# Patient Record
Sex: MALE | Race: WHITE | NOT HISPANIC OR LATINO | Employment: UNEMPLOYED | URBAN - METROPOLITAN AREA
[De-identification: names, ages, dates, MRNs, and addresses within clinical notes are randomized per-mention and may not be internally consistent; named-entity substitution may affect disease eponyms.]

---

## 2017-09-13 ENCOUNTER — OFFICE VISIT (OUTPATIENT)
Dept: PRIMARY CARE CLINIC | Facility: CLINIC | Age: 22
End: 2017-09-13
Payer: COMMERCIAL

## 2017-09-13 VITALS
HEART RATE: 75 BPM | BODY MASS INDEX: 19.95 KG/M2 | WEIGHT: 139.38 LBS | RESPIRATION RATE: 18 BRPM | HEIGHT: 70 IN | SYSTOLIC BLOOD PRESSURE: 117 MMHG | DIASTOLIC BLOOD PRESSURE: 73 MMHG | TEMPERATURE: 98 F | OXYGEN SATURATION: 97 %

## 2017-09-13 DIAGNOSIS — F90.2 ATTENTION DEFICIT HYPERACTIVITY DISORDER, COMBINED TYPE: Primary | ICD-10-CM

## 2017-09-13 PROCEDURE — 99213 OFFICE O/P EST LOW 20 MIN: CPT | Mod: S$GLB,,, | Performed by: FAMILY MEDICINE

## 2017-09-13 PROCEDURE — 3008F BODY MASS INDEX DOCD: CPT | Mod: S$GLB,,, | Performed by: FAMILY MEDICINE

## 2017-09-13 RX ORDER — DEXTROAMPHETAMINE SACCHARATE, AMPHETAMINE ASPARTATE, DEXTROAMPHETAMINE SULFATE AND AMPHETAMINE SULFATE 7.5; 7.5; 7.5; 7.5 MG/1; MG/1; MG/1; MG/1
0.5 TABLET ORAL DAILY
Qty: 15 TABLET | Refills: 0 | Status: SHIPPED | OUTPATIENT
Start: 2017-11-12 | End: 2017-12-27 | Stop reason: SDUPTHER

## 2017-09-13 RX ORDER — BUPROPION HYDROCHLORIDE 150 MG/1
150 TABLET ORAL DAILY
COMMUNITY
End: 2017-09-13 | Stop reason: SDUPTHER

## 2017-09-13 RX ORDER — DEXTROAMPHETAMINE SACCHARATE, AMPHETAMINE ASPARTATE MONOHYDRATE, DEXTROAMPHETAMINE SULFATE AND AMPHETAMINE SULFATE 7.5; 7.5; 7.5; 7.5 MG/1; MG/1; MG/1; MG/1
30 CAPSULE, EXTENDED RELEASE ORAL EVERY MORNING
Qty: 30 CAPSULE | Refills: 0 | Status: SHIPPED | OUTPATIENT
Start: 2017-10-13 | End: 2017-12-27 | Stop reason: SDUPTHER

## 2017-09-13 RX ORDER — DEXTROAMPHETAMINE SACCHARATE, AMPHETAMINE ASPARTATE, DEXTROAMPHETAMINE SULFATE AND AMPHETAMINE SULFATE 7.5; 7.5; 7.5; 7.5 MG/1; MG/1; MG/1; MG/1
0.5 TABLET ORAL DAILY
Qty: 15 TABLET | Refills: 0 | Status: SHIPPED | OUTPATIENT
Start: 2017-10-13 | End: 2017-12-27 | Stop reason: SDUPTHER

## 2017-09-13 RX ORDER — DEXTROAMPHETAMINE SACCHARATE, AMPHETAMINE ASPARTATE, DEXTROAMPHETAMINE SULFATE AND AMPHETAMINE SULFATE 7.5; 7.5; 7.5; 7.5 MG/1; MG/1; MG/1; MG/1
0.5 TABLET ORAL DAILY
COMMUNITY
End: 2017-09-13 | Stop reason: SDUPTHER

## 2017-09-13 RX ORDER — BUPROPION HYDROCHLORIDE 150 MG/1
150 TABLET ORAL DAILY
Qty: 30 TABLET | Refills: 5 | Status: SHIPPED | OUTPATIENT
Start: 2017-09-13 | End: 2017-12-27 | Stop reason: SDUPTHER

## 2017-09-13 RX ORDER — DEXTROAMPHETAMINE SACCHARATE, AMPHETAMINE ASPARTATE MONOHYDRATE, DEXTROAMPHETAMINE SULFATE AND AMPHETAMINE SULFATE 7.5; 7.5; 7.5; 7.5 MG/1; MG/1; MG/1; MG/1
30 CAPSULE, EXTENDED RELEASE ORAL EVERY MORNING
Qty: 30 CAPSULE | Refills: 0 | Status: SHIPPED | OUTPATIENT
Start: 2017-09-13 | End: 2017-12-27 | Stop reason: SDUPTHER

## 2017-09-13 RX ORDER — DEXTROAMPHETAMINE SACCHARATE, AMPHETAMINE ASPARTATE MONOHYDRATE, DEXTROAMPHETAMINE SULFATE AND AMPHETAMINE SULFATE 7.5; 7.5; 7.5; 7.5 MG/1; MG/1; MG/1; MG/1
30 CAPSULE, EXTENDED RELEASE ORAL EVERY MORNING
COMMUNITY
End: 2017-09-13 | Stop reason: SDUPTHER

## 2017-09-13 RX ORDER — DEXTROAMPHETAMINE SACCHARATE, AMPHETAMINE ASPARTATE, DEXTROAMPHETAMINE SULFATE AND AMPHETAMINE SULFATE 7.5; 7.5; 7.5; 7.5 MG/1; MG/1; MG/1; MG/1
0.5 TABLET ORAL DAILY
Qty: 15 TABLET | Refills: 0 | Status: SHIPPED | OUTPATIENT
Start: 2017-09-13 | End: 2017-12-27 | Stop reason: SDUPTHER

## 2017-09-13 RX ORDER — DEXTROAMPHETAMINE SACCHARATE, AMPHETAMINE ASPARTATE MONOHYDRATE, DEXTROAMPHETAMINE SULFATE AND AMPHETAMINE SULFATE 7.5; 7.5; 7.5; 7.5 MG/1; MG/1; MG/1; MG/1
30 CAPSULE, EXTENDED RELEASE ORAL EVERY MORNING
Qty: 30 CAPSULE | Refills: 0 | Status: SHIPPED | OUTPATIENT
Start: 2017-11-12 | End: 2017-12-27 | Stop reason: SDUPTHER

## 2017-09-13 NOTE — PROGRESS NOTES
Subjective:       Patient ID: Anil Velasquez is a 21 y.o. male.    Chief Complaint: Medication Refill    Patient presents for ADHD medication refill.  Prescribed medication has been working well with regard to efficacy (improved focus and attention, less easily distracted).  Tolerating medication well without significant adverse side effects (loss of appetite, insomnia, irritability, chest pain/palpitations). Not currently in school, but working full-time at [employer]. Contemplating future plans WRT work, education, etc.      Review of Systems   Constitutional: Negative for appetite change and unexpected weight change.   Cardiovascular: Negative for chest pain and palpitations.   Psychiatric/Behavioral: Negative for agitation and sleep disturbance. The patient is not nervous/anxious.        Objective:      Physical Exam   Constitutional: He is oriented to person, place, and time. He appears well-developed and well-nourished.   HENT:   Head: Normocephalic and atraumatic.   Cardiovascular: Normal rate, regular rhythm and normal heart sounds.    Pulmonary/Chest: Effort normal and breath sounds normal.   Musculoskeletal: He exhibits no edema.   Neurological: He is alert and oriented to person, place, and time.   Skin: Skin is warm and dry.   Psychiatric: He has a normal mood and affect. His behavior is normal.   Vitals reviewed.      Assessment:       1. Attention deficit hyperactivity disorder, combined type        Plan:       Attention deficit hyperactivity disorder, combined type  -     buPROPion (WELLBUTRIN XL) 150 MG TB24 tablet; Take 1 tablet (150 mg total) by mouth once daily.  Dispense: 30 tablet; Refill: 5  -     dextroamphetamine-amphetamine (ADDERALL XR) 30 MG 24 hr capsule; Take 1 capsule (30 mg total) by mouth every morning.  Dispense: 30 capsule; Refill: 0  -     dextroamphetamine-amphetamine 30 mg Tab; Take 0.5 tablets by mouth once daily. In the afternoon  Dispense: 15 tablet; Refill: 0  -      dextroamphetamine-amphetamine (ADDERALL XR) 30 MG 24 hr capsule; Take 1 capsule (30 mg total) by mouth every morning.  Dispense: 30 capsule; Refill: 0  -     dextroamphetamine-amphetamine (ADDERALL XR) 30 MG 24 hr capsule; Take 1 capsule (30 mg total) by mouth every morning.  Dispense: 30 capsule; Refill: 0  -     dextroamphetamine-amphetamine 30 mg Tab; Take 0.5 tablets by mouth once daily. In the afternoon  Dispense: 15 tablet; Refill: 0  -     dextroamphetamine-amphetamine 30 mg Tab; Take 0.5 tablets by mouth once daily. In the afternoon  Dispense: 15 tablet; Refill: 0

## 2017-12-27 ENCOUNTER — OFFICE VISIT (OUTPATIENT)
Dept: PRIMARY CARE CLINIC | Facility: CLINIC | Age: 22
End: 2017-12-27
Payer: COMMERCIAL

## 2017-12-27 VITALS
WEIGHT: 146.5 LBS | HEIGHT: 70 IN | HEART RATE: 72 BPM | BODY MASS INDEX: 20.97 KG/M2 | DIASTOLIC BLOOD PRESSURE: 70 MMHG | OXYGEN SATURATION: 98 % | SYSTOLIC BLOOD PRESSURE: 117 MMHG | TEMPERATURE: 98 F | RESPIRATION RATE: 18 BRPM

## 2017-12-27 DIAGNOSIS — Z20.828 EXPOSURE TO INFLUENZA: ICD-10-CM

## 2017-12-27 DIAGNOSIS — F90.2 ATTENTION DEFICIT HYPERACTIVITY DISORDER, COMBINED TYPE: Primary | ICD-10-CM

## 2017-12-27 PROCEDURE — 99213 OFFICE O/P EST LOW 20 MIN: CPT | Mod: S$GLB,,, | Performed by: FAMILY MEDICINE

## 2017-12-27 PROCEDURE — 99999 PR PBB SHADOW E&M-EST. PATIENT-LVL III: CPT | Mod: PBBFAC,,, | Performed by: FAMILY MEDICINE

## 2017-12-27 RX ORDER — DEXTROAMPHETAMINE SACCHARATE, AMPHETAMINE ASPARTATE MONOHYDRATE, DEXTROAMPHETAMINE SULFATE AND AMPHETAMINE SULFATE 7.5; 7.5; 7.5; 7.5 MG/1; MG/1; MG/1; MG/1
30 CAPSULE, EXTENDED RELEASE ORAL EVERY MORNING
Qty: 30 CAPSULE | Refills: 0 | Status: SHIPPED | OUTPATIENT
Start: 2018-01-26 | End: 2018-03-27

## 2017-12-27 RX ORDER — DEXTROAMPHETAMINE SACCHARATE, AMPHETAMINE ASPARTATE, DEXTROAMPHETAMINE SULFATE AND AMPHETAMINE SULFATE 7.5; 7.5; 7.5; 7.5 MG/1; MG/1; MG/1; MG/1
0.5 TABLET ORAL DAILY
Qty: 15 TABLET | Refills: 0 | Status: SHIPPED | OUTPATIENT
Start: 2018-01-26 | End: 2018-03-27

## 2017-12-27 RX ORDER — BUPROPION HYDROCHLORIDE 150 MG/1
150 TABLET ORAL DAILY
Qty: 30 TABLET | Refills: 5 | Status: SHIPPED | OUTPATIENT
Start: 2017-12-27 | End: 2021-03-16

## 2017-12-27 RX ORDER — DEXTROAMPHETAMINE SACCHARATE, AMPHETAMINE ASPARTATE, DEXTROAMPHETAMINE SULFATE AND AMPHETAMINE SULFATE 7.5; 7.5; 7.5; 7.5 MG/1; MG/1; MG/1; MG/1
0.5 TABLET ORAL DAILY
Qty: 15 TABLET | Refills: 0 | Status: SHIPPED | OUTPATIENT
Start: 2018-02-25 | End: 2018-03-27 | Stop reason: SDUPTHER

## 2017-12-27 RX ORDER — DEXTROAMPHETAMINE SACCHARATE, AMPHETAMINE ASPARTATE, DEXTROAMPHETAMINE SULFATE AND AMPHETAMINE SULFATE 7.5; 7.5; 7.5; 7.5 MG/1; MG/1; MG/1; MG/1
0.5 TABLET ORAL DAILY
Qty: 15 TABLET | Refills: 0 | Status: SHIPPED | OUTPATIENT
Start: 2017-12-27 | End: 2018-03-27

## 2017-12-27 RX ORDER — DEXTROAMPHETAMINE SACCHARATE, AMPHETAMINE ASPARTATE MONOHYDRATE, DEXTROAMPHETAMINE SULFATE AND AMPHETAMINE SULFATE 7.5; 7.5; 7.5; 7.5 MG/1; MG/1; MG/1; MG/1
30 CAPSULE, EXTENDED RELEASE ORAL EVERY MORNING
Qty: 30 CAPSULE | Refills: 0 | Status: SHIPPED | OUTPATIENT
Start: 2018-02-25 | End: 2018-03-27 | Stop reason: SDUPTHER

## 2017-12-27 RX ORDER — OSELTAMIVIR PHOSPHATE 75 MG/1
75 CAPSULE ORAL DAILY
Qty: 10 CAPSULE | Refills: 0 | Status: SHIPPED | OUTPATIENT
Start: 2017-12-27 | End: 2018-01-06

## 2017-12-27 RX ORDER — DEXTROAMPHETAMINE SACCHARATE, AMPHETAMINE ASPARTATE MONOHYDRATE, DEXTROAMPHETAMINE SULFATE AND AMPHETAMINE SULFATE 7.5; 7.5; 7.5; 7.5 MG/1; MG/1; MG/1; MG/1
30 CAPSULE, EXTENDED RELEASE ORAL EVERY MORNING
Qty: 30 CAPSULE | Refills: 0 | Status: SHIPPED | OUTPATIENT
Start: 2017-12-27 | End: 2018-03-27

## 2017-12-27 NOTE — PROGRESS NOTES
"Subjective:       Patient ID: Anil Velasquez is a 22 y.o. male.    Chief Complaint: Medication Refill    Patient presents for ADHD medication refill.  Prescribed medication has been working well with regard to efficacy (improved focus and attention, less easily distracted).  Tolerating medication well without significant adverse side effects (loss of appetite, insomnia, irritability, chest pain/palpitations).  Patient's mother diagnosed with influenza yesterday, patient not currently symptomatic but wants something to prevent the flu      Review of Systems   Constitutional: Negative for appetite change, chills, fever and unexpected weight change.   Respiratory: Negative for shortness of breath.    Cardiovascular: Negative for chest pain and palpitations.   Psychiatric/Behavioral: Negative for sleep disturbance.       Objective:      Vitals:    12/27/17 1649   BP: 117/70   BP Location: Left arm   Patient Position: Sitting   BP Method: Medium (Automatic)   Pulse: 72   Resp: 18   Temp: 97.6 °F (36.4 °C)   TempSrc: Oral   SpO2: 98%   Weight: 66.5 kg (146 lb 8 oz)   Height: 5' 10" (1.778 m)     Physical Exam   Constitutional: He is oriented to person, place, and time. He appears well-developed and well-nourished.   HENT:   Head: Normocephalic and atraumatic.   Cardiovascular: Normal rate, regular rhythm and normal heart sounds.    Pulmonary/Chest: Effort normal and breath sounds normal.   Musculoskeletal: He exhibits no edema.   Neurological: He is alert and oriented to person, place, and time.   Skin: Skin is warm and dry.   Psychiatric: He has a normal mood and affect. His behavior is normal.   Nursing note and vitals reviewed.      Assessment:       1. Attention deficit hyperactivity disorder, combined type    2. Exposure to influenza        Plan:       Attention deficit hyperactivity disorder, combined type  -     dextroamphetamine-amphetamine (ADDERALL XR) 30 MG 24 hr capsule; Take 1 capsule (30 mg total) by mouth " every morning.  Dispense: 30 capsule; Refill: 0  -     dextroamphetamine-amphetamine 30 mg Tab; Take 0.5 tablets by mouth once daily. In the afternoon  Dispense: 15 tablet; Refill: 0  -     buPROPion (WELLBUTRIN XL) 150 MG TB24 tablet; Take 1 tablet (150 mg total) by mouth once daily.  Dispense: 30 tablet; Refill: 5  -     dextroamphetamine-amphetamine (ADDERALL XR) 30 MG 24 hr capsule; Take 1 capsule (30 mg total) by mouth every morning.  Dispense: 30 capsule; Refill: 0  -     dextroamphetamine-amphetamine (ADDERALL XR) 30 MG 24 hr capsule; Take 1 capsule (30 mg total) by mouth every morning.  Dispense: 30 capsule; Refill: 0  -     dextroamphetamine-amphetamine 30 mg Tab; Take 0.5 tablets by mouth once daily. In the afternoon  Dispense: 15 tablet; Refill: 0  -     dextroamphetamine-amphetamine 30 mg Tab; Take 0.5 tablets by mouth once daily. In the afternoon  Dispense: 15 tablet; Refill: 0    Exposure to influenza  -     oseltamivir (TAMIFLU) 75 MG capsule; Take 1 capsule (75 mg total) by mouth once daily.  Dispense: 10 capsule; Refill: 0      Medication List with Changes/Refills   New Medications    OSELTAMIVIR (TAMIFLU) 75 MG CAPSULE    Take 1 capsule (75 mg total) by mouth once daily.   Changed and/or Refilled Medications    Modified Medication Previous Medication    BUPROPION (WELLBUTRIN XL) 150 MG TB24 TABLET buPROPion (WELLBUTRIN XL) 150 MG TB24 tablet       Take 1 tablet (150 mg total) by mouth once daily.    Take 1 tablet (150 mg total) by mouth once daily.    DEXTROAMPHETAMINE-AMPHETAMINE (ADDERALL XR) 30 MG 24 HR CAPSULE dextroamphetamine-amphetamine (ADDERALL XR) 30 MG 24 hr capsule       Take 1 capsule (30 mg total) by mouth every morning.    Take 1 capsule (30 mg total) by mouth every morning.    DEXTROAMPHETAMINE-AMPHETAMINE (ADDERALL XR) 30 MG 24 HR CAPSULE dextroamphetamine-amphetamine (ADDERALL XR) 30 MG 24 hr capsule       Take 1 capsule (30 mg total) by mouth every morning.    Take 1 capsule (30  mg total) by mouth every morning.    DEXTROAMPHETAMINE-AMPHETAMINE (ADDERALL XR) 30 MG 24 HR CAPSULE dextroamphetamine-amphetamine (ADDERALL XR) 30 MG 24 hr capsule       Take 1 capsule (30 mg total) by mouth every morning.    Take 1 capsule (30 mg total) by mouth every morning.    DEXTROAMPHETAMINE-AMPHETAMINE 30 MG TAB dextroamphetamine-amphetamine 30 mg Tab       Take 0.5 tablets by mouth once daily. In the afternoon    Take 0.5 tablets by mouth once daily. In the afternoon    DEXTROAMPHETAMINE-AMPHETAMINE 30 MG TAB dextroamphetamine-amphetamine 30 mg Tab       Take 0.5 tablets by mouth once daily. In the afternoon    Take 0.5 tablets by mouth once daily. In the afternoon    DEXTROAMPHETAMINE-AMPHETAMINE 30 MG TAB dextroamphetamine-amphetamine 30 mg Tab       Take 0.5 tablets by mouth once daily. In the afternoon    Take 0.5 tablets by mouth once daily. In the afternoon

## 2017-12-27 NOTE — LETTER
December 27, 2017      [facility]  [address] [place] [id number]  Phone: [phone]  Fax: [phone]       Patient: Anil Velasquez   YOB: 1995  Date of Visit: 12/27/2017    To Whom It May Concern:    Kristin Velasquez  was at [facility] on 12/27/2017. He may return to work/school on 12/28/17 with no restrictions. If you have any questions or concerns, or if I can be of further assistance, please do not hesitate to contact me.    Sincerely,    Juan Cardenas MA

## 2018-03-27 ENCOUNTER — OFFICE VISIT (OUTPATIENT)
Dept: PRIMARY CARE CLINIC | Facility: CLINIC | Age: 23
End: 2018-03-27
Payer: COMMERCIAL

## 2018-03-27 VITALS
RESPIRATION RATE: 18 BRPM | HEIGHT: 70 IN | SYSTOLIC BLOOD PRESSURE: 120 MMHG | OXYGEN SATURATION: 97 % | HEART RATE: 65 BPM | BODY MASS INDEX: 21.62 KG/M2 | WEIGHT: 151 LBS | TEMPERATURE: 97 F | DIASTOLIC BLOOD PRESSURE: 74 MMHG

## 2018-03-27 DIAGNOSIS — F90.2 ATTENTION DEFICIT HYPERACTIVITY DISORDER, COMBINED TYPE: ICD-10-CM

## 2018-03-27 PROCEDURE — 99213 OFFICE O/P EST LOW 20 MIN: CPT | Mod: S$GLB,,, | Performed by: FAMILY MEDICINE

## 2018-03-27 PROCEDURE — 99999 PR PBB SHADOW E&M-EST. PATIENT-LVL III: CPT | Mod: PBBFAC,,, | Performed by: FAMILY MEDICINE

## 2018-03-27 RX ORDER — DEXTROAMPHETAMINE SACCHARATE, AMPHETAMINE ASPARTATE MONOHYDRATE, DEXTROAMPHETAMINE SULFATE AND AMPHETAMINE SULFATE 7.5; 7.5; 7.5; 7.5 MG/1; MG/1; MG/1; MG/1
30 CAPSULE, EXTENDED RELEASE ORAL EVERY MORNING
Qty: 30 CAPSULE | Refills: 0 | Status: SHIPPED | OUTPATIENT
Start: 2018-03-27 | End: 2018-06-27

## 2018-03-27 RX ORDER — DEXTROAMPHETAMINE SACCHARATE, AMPHETAMINE ASPARTATE, DEXTROAMPHETAMINE SULFATE AND AMPHETAMINE SULFATE 7.5; 7.5; 7.5; 7.5 MG/1; MG/1; MG/1; MG/1
0.5 TABLET ORAL DAILY
Qty: 15 TABLET | Refills: 0 | Status: SHIPPED | OUTPATIENT
Start: 2018-04-26 | End: 2018-06-27

## 2018-03-27 RX ORDER — DEXTROAMPHETAMINE SACCHARATE, AMPHETAMINE ASPARTATE MONOHYDRATE, DEXTROAMPHETAMINE SULFATE AND AMPHETAMINE SULFATE 7.5; 7.5; 7.5; 7.5 MG/1; MG/1; MG/1; MG/1
30 CAPSULE, EXTENDED RELEASE ORAL EVERY MORNING
Qty: 30 CAPSULE | Refills: 0 | Status: SHIPPED | OUTPATIENT
Start: 2018-04-26 | End: 2018-06-27

## 2018-03-27 RX ORDER — BUPROPION HYDROCHLORIDE 150 MG/1
150 TABLET ORAL DAILY
Qty: 30 TABLET | Refills: 5 | Status: CANCELLED | OUTPATIENT
Start: 2018-03-27

## 2018-03-27 RX ORDER — DEXTROAMPHETAMINE SACCHARATE, AMPHETAMINE ASPARTATE, DEXTROAMPHETAMINE SULFATE AND AMPHETAMINE SULFATE 7.5; 7.5; 7.5; 7.5 MG/1; MG/1; MG/1; MG/1
0.5 TABLET ORAL DAILY
Qty: 15 TABLET | Refills: 0 | Status: SHIPPED | OUTPATIENT
Start: 2018-05-26 | End: 2018-06-27 | Stop reason: SDUPTHER

## 2018-03-27 RX ORDER — DEXTROAMPHETAMINE SACCHARATE, AMPHETAMINE ASPARTATE, DEXTROAMPHETAMINE SULFATE AND AMPHETAMINE SULFATE 7.5; 7.5; 7.5; 7.5 MG/1; MG/1; MG/1; MG/1
0.5 TABLET ORAL DAILY
Qty: 15 TABLET | Refills: 0 | Status: SHIPPED | OUTPATIENT
Start: 2018-03-27 | End: 2018-06-27

## 2018-03-27 RX ORDER — DEXTROAMPHETAMINE SACCHARATE, AMPHETAMINE ASPARTATE MONOHYDRATE, DEXTROAMPHETAMINE SULFATE AND AMPHETAMINE SULFATE 7.5; 7.5; 7.5; 7.5 MG/1; MG/1; MG/1; MG/1
30 CAPSULE, EXTENDED RELEASE ORAL EVERY MORNING
Qty: 30 CAPSULE | Refills: 0 | Status: SHIPPED | OUTPATIENT
Start: 2018-05-26 | End: 2018-06-27 | Stop reason: SDUPTHER

## 2018-03-27 NOTE — LETTER
March 27, 2018      [facility]  [address] [place] [id number]  Phone: [phone]  Fax: [phone]       Patient: Anil Velasquez   YOB: 1995  Date of Visit: 03/27/2018    To Whom It May Concern:    Kristin Velasquez  was at [facility] on 03/27/2018. He may return to work on 3/28/2018 with no restrictions. If you have any questions or concerns, or if I can be of further assistance, please do not hesitate to contact me.    Sincerely,    Zainab Frausto LPN

## 2018-03-27 NOTE — PROGRESS NOTES
"Subjective:       Patient ID: Anil Velasquez is a 22 y.o. male.    Chief Complaint: Medication Refill    Patient presents for ADHD medication refill.  Prescribed medication has been working well with regard to efficacy (improved focus and attention, less easily distracted).  Tolerating medication well without significant adverse side effects (loss of appetite, insomnia, irritability, chest pain/palpitations). 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      Review of Systems   Constitutional: Negative for appetite change and unexpected weight change.   Respiratory: Negative for shortness of breath.    Cardiovascular: Negative for chest pain and palpitations.   Psychiatric/Behavioral: Negative for agitation, confusion and sleep disturbance.       Objective:      Vitals:    03/27/18 1600   BP: 120/74   BP Location: Right arm   Patient Position: Sitting   BP Method: Medium (Automatic)   Pulse: 65   Resp: 18   Temp: 97.4 °F (36.3 °C)   TempSrc: Oral   SpO2: 97%   Weight: 68.5 kg (151 lb)   Height: 5' 10" (1.778 m)     Physical Exam    Assessment:       1. Attention deficit hyperactivity disorder, combined type        Plan:       Attention deficit hyperactivity disorder, combined type  -     dextroamphetamine-amphetamine (ADDERALL XR) 30 MG 24 hr capsule; Take 1 capsule (30 mg total) by mouth every morning.  Dispense: 30 capsule; Refill: 0  -     dextroamphetamine-amphetamine 30 mg Tab; Take 0.5 tablets by mouth once daily. In the afternoon  Dispense: 15 tablet; Refill: 0  -     dextroamphetamine-amphetamine (ADDERALL XR) 30 MG 24 hr capsule; Take 1 capsule (30 mg total) by mouth every morning.  Dispense: 30 capsule; Refill: 0  -     dextroamphetamine-amphetamine (ADDERALL XR) 30 MG 24 hr capsule; Take 1 capsule (30 mg total) by mouth every morning.  Dispense: 30 capsule; Refill: 0  -     dextroamphetamine-amphetamine 30 mg Tab; Take 0.5 tablets by mouth once daily. In the afternoon  Dispense: 15 tablet; Refill: " 0  -     dextroamphetamine-amphetamine 30 mg Tab; Take 0.5 tablets by mouth once daily. In the afternoon  Dispense: 15 tablet; Refill: 0    Other orders  -     Cancel: buPROPion (WELLBUTRIN XL) 150 MG TB24 tablet; Take 1 tablet (150 mg total) by mouth once daily.  Dispense: 30 tablet; Refill: 5      Medication List with Changes/Refills   New Medications    DEXTROAMPHETAMINE-AMPHETAMINE (ADDERALL XR) 30 MG 24 HR CAPSULE    Take 1 capsule (30 mg total) by mouth every morning.    DEXTROAMPHETAMINE-AMPHETAMINE (ADDERALL XR) 30 MG 24 HR CAPSULE    Take 1 capsule (30 mg total) by mouth every morning.   Current Medications    BUPROPION (WELLBUTRIN XL) 150 MG TB24 TABLET    Take 1 tablet (150 mg total) by mouth once daily.   Changed and/or Refilled Medications    Modified Medication Previous Medication    DEXTROAMPHETAMINE-AMPHETAMINE (ADDERALL XR) 30 MG 24 HR CAPSULE dextroamphetamine-amphetamine (ADDERALL XR) 30 MG 24 hr capsule       Take 1 capsule (30 mg total) by mouth every morning.    Take 1 capsule (30 mg total) by mouth every morning.    DEXTROAMPHETAMINE-AMPHETAMINE 30 MG TAB dextroamphetamine-amphetamine 30 mg Tab       Take 0.5 tablets by mouth once daily. In the afternoon    Take 0.5 tablets by mouth once daily. In the afternoon    DEXTROAMPHETAMINE-AMPHETAMINE 30 MG TAB dextroamphetamine-amphetamine 30 mg Tab       Take 0.5 tablets by mouth once daily. In the afternoon    Take 0.5 tablets by mouth once daily. In the afternoon    DEXTROAMPHETAMINE-AMPHETAMINE 30 MG TAB dextroamphetamine-amphetamine 30 mg Tab       Take 0.5 tablets by mouth once daily. In the afternoon    Take 0.5 tablets by mouth once daily. In the afternoon   Discontinued Medications    DEXTROAMPHETAMINE-AMPHETAMINE (ADDERALL XR) 30 MG 24 HR CAPSULE    Take 1 capsule (30 mg total) by mouth every morning.    DEXTROAMPHETAMINE-AMPHETAMINE (ADDERALL XR) 30 MG 24 HR CAPSULE    Take 1 capsule (30 mg total) by mouth every morning.

## 2018-06-27 ENCOUNTER — OFFICE VISIT (OUTPATIENT)
Dept: PRIMARY CARE CLINIC | Facility: CLINIC | Age: 23
End: 2018-06-27
Payer: COMMERCIAL

## 2018-06-27 ENCOUNTER — CLINICAL SUPPORT (OUTPATIENT)
Dept: PRIMARY CARE CLINIC | Facility: CLINIC | Age: 23
End: 2018-06-27
Payer: COMMERCIAL

## 2018-06-27 VITALS
HEART RATE: 75 BPM | BODY MASS INDEX: 23.05 KG/M2 | DIASTOLIC BLOOD PRESSURE: 69 MMHG | SYSTOLIC BLOOD PRESSURE: 110 MMHG | TEMPERATURE: 98 F | OXYGEN SATURATION: 98 % | RESPIRATION RATE: 18 BRPM | HEIGHT: 70 IN | WEIGHT: 161 LBS

## 2018-06-27 DIAGNOSIS — Z79.899 HIGH RISK MEDICATION USE: ICD-10-CM

## 2018-06-27 DIAGNOSIS — F90.2 ATTENTION DEFICIT HYPERACTIVITY DISORDER, COMBINED TYPE: Primary | ICD-10-CM

## 2018-06-27 DIAGNOSIS — B35.6 TINEA CRURIS: ICD-10-CM

## 2018-06-27 PROCEDURE — 99214 OFFICE O/P EST MOD 30 MIN: CPT | Mod: S$GLB,,, | Performed by: FAMILY MEDICINE

## 2018-06-27 PROCEDURE — 99999 PR PBB SHADOW E&M-EST. PATIENT-LVL III: CPT | Mod: PBBFAC,,, | Performed by: FAMILY MEDICINE

## 2018-06-27 PROCEDURE — 3008F BODY MASS INDEX DOCD: CPT | Mod: CPTII,S$GLB,, | Performed by: FAMILY MEDICINE

## 2018-06-27 PROCEDURE — 80307 DRUG TEST PRSMV CHEM ANLYZR: CPT

## 2018-06-27 RX ORDER — DEXTROAMPHETAMINE SACCHARATE, AMPHETAMINE ASPARTATE MONOHYDRATE, DEXTROAMPHETAMINE SULFATE AND AMPHETAMINE SULFATE 7.5; 7.5; 7.5; 7.5 MG/1; MG/1; MG/1; MG/1
30 CAPSULE, EXTENDED RELEASE ORAL EVERY MORNING
Qty: 30 CAPSULE | Refills: 0 | Status: SHIPPED | OUTPATIENT
Start: 2018-08-26 | End: 2021-03-16

## 2018-06-27 RX ORDER — DEXTROAMPHETAMINE SACCHARATE, AMPHETAMINE ASPARTATE, DEXTROAMPHETAMINE SULFATE AND AMPHETAMINE SULFATE 7.5; 7.5; 7.5; 7.5 MG/1; MG/1; MG/1; MG/1
0.5 TABLET ORAL DAILY
Qty: 15 TABLET | Refills: 0 | Status: SHIPPED | OUTPATIENT
Start: 2018-08-26 | End: 2021-03-16

## 2018-06-27 RX ORDER — DEXTROAMPHETAMINE SACCHARATE, AMPHETAMINE ASPARTATE MONOHYDRATE, DEXTROAMPHETAMINE SULFATE AND AMPHETAMINE SULFATE 7.5; 7.5; 7.5; 7.5 MG/1; MG/1; MG/1; MG/1
30 CAPSULE, EXTENDED RELEASE ORAL EVERY MORNING
Qty: 30 CAPSULE | Refills: 0 | Status: SHIPPED | OUTPATIENT
Start: 2018-06-27 | End: 2021-03-16

## 2018-06-27 RX ORDER — CLOTRIMAZOLE AND BETAMETHASONE DIPROPIONATE 10; .64 MG/G; MG/G
CREAM TOPICAL 2 TIMES DAILY
Qty: 45 G | Refills: 1 | Status: SHIPPED | OUTPATIENT
Start: 2018-06-27 | End: 2021-03-16

## 2018-06-27 RX ORDER — DEXTROAMPHETAMINE SACCHARATE, AMPHETAMINE ASPARTATE, DEXTROAMPHETAMINE SULFATE AND AMPHETAMINE SULFATE 7.5; 7.5; 7.5; 7.5 MG/1; MG/1; MG/1; MG/1
0.5 TABLET ORAL DAILY
Qty: 15 TABLET | Refills: 0 | Status: SHIPPED | OUTPATIENT
Start: 2018-06-27 | End: 2021-03-16

## 2018-06-27 RX ORDER — DEXTROAMPHETAMINE SACCHARATE, AMPHETAMINE ASPARTATE, DEXTROAMPHETAMINE SULFATE AND AMPHETAMINE SULFATE 7.5; 7.5; 7.5; 7.5 MG/1; MG/1; MG/1; MG/1
0.5 TABLET ORAL DAILY
Qty: 15 TABLET | Refills: 0 | Status: SHIPPED | OUTPATIENT
Start: 2018-07-27 | End: 2021-03-16

## 2018-06-27 RX ORDER — DEXTROAMPHETAMINE SACCHARATE, AMPHETAMINE ASPARTATE MONOHYDRATE, DEXTROAMPHETAMINE SULFATE AND AMPHETAMINE SULFATE 7.5; 7.5; 7.5; 7.5 MG/1; MG/1; MG/1; MG/1
30 CAPSULE, EXTENDED RELEASE ORAL EVERY MORNING
Qty: 30 CAPSULE | Refills: 0 | Status: SHIPPED | OUTPATIENT
Start: 2018-07-27 | End: 2021-03-16

## 2018-06-27 NOTE — PROGRESS NOTES
"Subjective:       Patient ID: Anil Velasquez is a 22 y.o. male.    Chief Complaint: ADHD    Patient presents for ADHD medication refill.  Prescribed medication has been working well with regard to efficacy (improved focus and attention, less easily distracted).  Tolerating medication well without significant adverse side effects (loss of appetite, insomnia, irritability, chest pain/palpitations).    Rash   This is a new problem. The current episode started in the past 7 days. The problem has been gradually worsening since onset. The affected locations include the groin. The rash is characterized by redness and itchiness. He was exposed to nothing. Pertinent negatives include no congestion, fatigue, fever, shortness of breath, sore throat or vomiting. Past treatments include nothing. There is no history of allergies, asthma or eczema.     Review of Systems   Constitutional: Negative for appetite change, fatigue, fever and unexpected weight change.   HENT: Negative for congestion and sore throat.    Respiratory: Negative for shortness of breath.    Cardiovascular: Negative for chest pain and palpitations.   Gastrointestinal: Negative for nausea and vomiting.   Genitourinary: Negative for difficulty urinating.   Skin: Positive for rash.   Neurological: Negative for dizziness.   Hematological: Does not bruise/bleed easily.   Psychiatric/Behavioral: Negative for agitation, confusion and sleep disturbance.       Objective:      Vitals:    06/27/18 1543   BP: 110/69   BP Location: Left arm   Patient Position: Sitting   BP Method: Medium (Automatic)   Pulse: 75   Resp: 18   Temp: 98.2 °F (36.8 °C)   TempSrc: Oral   SpO2: 98%   Weight: 73 kg (161 lb)   Height: 5' 10" (1.778 m)     Physical Exam   Constitutional: He is oriented to person, place, and time. He appears well-developed and well-nourished.   HENT:   Head: Normocephalic and atraumatic.   Eyes: EOM are normal.   Neck: Neck supple. No JVD present.   Cardiovascular: " Normal rate, regular rhythm and normal heart sounds.    Pulmonary/Chest: Effort normal and breath sounds normal.   Musculoskeletal: He exhibits no edema.   Neurological: He is alert and oriented to person, place, and time.   Skin: Skin is warm and dry. Rash (erythematous, excoriated patchy rash in groin bilaterally) noted.   Psychiatric: He has a normal mood and affect. His behavior is normal.   Nursing note and vitals reviewed.      Assessment:       1. Attention deficit hyperactivity disorder, combined type    2. High risk medication use    3. Tinea cruris        Plan:       Attention deficit hyperactivity disorder, combined type  -     dextroamphetamine-amphetamine 30 mg Tab; Take 0.5 tablets by mouth once daily. In the afternoon  Dispense: 15 tablet; Refill: 0  -     dextroamphetamine-amphetamine (ADDERALL XR) 30 MG 24 hr capsule; Take 1 capsule (30 mg total) by mouth every morning.  Dispense: 30 capsule; Refill: 0  -     dextroamphetamine-amphetamine 30 mg Tab; Take 0.5 tablets by mouth once daily. In the afternoon  Dispense: 15 tablet; Refill: 0  -     dextroamphetamine-amphetamine 30 mg Tab; Take 0.5 tablets by mouth once daily. In the afternoon  Dispense: 15 tablet; Refill: 0  -     dextroamphetamine-amphetamine (ADDERALL XR) 30 MG 24 hr capsule; Take 1 capsule (30 mg total) by mouth every morning.  Dispense: 30 capsule; Refill: 0  -     dextroamphetamine-amphetamine (ADDERALL XR) 30 MG 24 hr capsule; Take 1 capsule (30 mg total) by mouth every morning.  Dispense: 30 capsule; Refill: 0    High risk medication use  Comments:  CSA signed  Orders:  -     [facility] Drug Screen    Tinea cruris  -     clotrimazole-betamethasone 1-0.05% (LOTRISONE) cream; Apply topically 2 (two) times daily.  Dispense: 45 g; Refill: 1      Medication List with Changes/Refills   New Medications    CLOTRIMAZOLE-BETAMETHASONE 1-0.05% (LOTRISONE) CREAM    Apply topically 2 (two) times daily.   Current Medications    BUPROPION  (WELLBUTRIN XL) 150 MG TB24 TABLET    Take 1 tablet (150 mg total) by mouth once daily.   Changed and/or Refilled Medications    Modified Medication Previous Medication    DEXTROAMPHETAMINE-AMPHETAMINE (ADDERALL XR) 30 MG 24 HR CAPSULE dextroamphetamine-amphetamine (ADDERALL XR) 30 MG 24 hr capsule       Take 1 capsule (30 mg total) by mouth every morning.    Take 1 capsule (30 mg total) by mouth every morning.    DEXTROAMPHETAMINE-AMPHETAMINE (ADDERALL XR) 30 MG 24 HR CAPSULE dextroamphetamine-amphetamine (ADDERALL XR) 30 MG 24 hr capsule       Take 1 capsule (30 mg total) by mouth every morning.    Take 1 capsule (30 mg total) by mouth every morning.    DEXTROAMPHETAMINE-AMPHETAMINE (ADDERALL XR) 30 MG 24 HR CAPSULE dextroamphetamine-amphetamine (ADDERALL XR) 30 MG 24 hr capsule       Take 1 capsule (30 mg total) by mouth every morning.    Take 1 capsule (30 mg total) by mouth every morning.    DEXTROAMPHETAMINE-AMPHETAMINE 30 MG TAB dextroamphetamine-amphetamine 30 mg Tab       Take 0.5 tablets by mouth once daily. In the afternoon    Take 0.5 tablets by mouth once daily. In the afternoon    DEXTROAMPHETAMINE-AMPHETAMINE 30 MG TAB dextroamphetamine-amphetamine 30 mg Tab       Take 0.5 tablets by mouth once daily. In the afternoon    Take 0.5 tablets by mouth once daily. In the afternoon    DEXTROAMPHETAMINE-AMPHETAMINE 30 MG TAB dextroamphetamine-amphetamine 30 mg Tab       Take 0.5 tablets by mouth once daily. In the afternoon    Take 0.5 tablets by mouth once daily. In the afternoon

## 2018-07-01 LAB

## 2018-07-11 ENCOUNTER — TELEPHONE (OUTPATIENT)
Dept: PRIMARY CARE CLINIC | Facility: CLINIC | Age: 23
End: 2018-07-11

## 2021-03-16 ENCOUNTER — OFFICE VISIT (OUTPATIENT)
Dept: PRIMARY CARE CLINIC | Facility: CLINIC | Age: 26
End: 2021-03-16
Payer: COMMERCIAL

## 2021-03-16 VITALS
BODY MASS INDEX: 23.86 KG/M2 | SYSTOLIC BLOOD PRESSURE: 114 MMHG | OXYGEN SATURATION: 98 % | TEMPERATURE: 98 F | WEIGHT: 166.69 LBS | RESPIRATION RATE: 16 BRPM | DIASTOLIC BLOOD PRESSURE: 72 MMHG | HEIGHT: 70 IN | HEART RATE: 90 BPM

## 2021-03-16 DIAGNOSIS — Z11.59 NEED FOR HEPATITIS C SCREENING TEST: ICD-10-CM

## 2021-03-16 DIAGNOSIS — Z13.6 ENCOUNTER FOR SCREENING FOR CARDIOVASCULAR DISORDERS: ICD-10-CM

## 2021-03-16 DIAGNOSIS — Z00.00 ANNUAL PHYSICAL EXAM: Primary | ICD-10-CM

## 2021-03-16 DIAGNOSIS — Z11.4 SCREENING FOR HIV (HUMAN IMMUNODEFICIENCY VIRUS): ICD-10-CM

## 2021-03-16 DIAGNOSIS — Z23 NEED FOR VACCINATION: ICD-10-CM

## 2021-03-16 PROCEDURE — 90471 IMMUNIZATION ADMIN: CPT | Mod: S$GLB,,, | Performed by: FAMILY MEDICINE

## 2021-03-16 PROCEDURE — 90715 TDAP VACCINE GREATER THAN OR EQUAL TO 7YO IM: ICD-10-PCS | Mod: S$GLB,,, | Performed by: FAMILY MEDICINE

## 2021-03-16 PROCEDURE — 90715 TDAP VACCINE 7 YRS/> IM: CPT | Mod: S$GLB,,, | Performed by: FAMILY MEDICINE

## 2021-03-16 PROCEDURE — 3008F PR BODY MASS INDEX (BMI) DOCUMENTED: ICD-10-PCS | Mod: CPTII,S$GLB,, | Performed by: FAMILY MEDICINE

## 2021-03-16 PROCEDURE — 1126F AMNT PAIN NOTED NONE PRSNT: CPT | Mod: S$GLB,,, | Performed by: FAMILY MEDICINE

## 2021-03-16 PROCEDURE — 99999 PR PBB SHADOW E&M-EST. PATIENT-LVL III: ICD-10-PCS | Mod: PBBFAC,,, | Performed by: FAMILY MEDICINE

## 2021-03-16 PROCEDURE — 3008F BODY MASS INDEX DOCD: CPT | Mod: CPTII,S$GLB,, | Performed by: FAMILY MEDICINE

## 2021-03-16 PROCEDURE — 99395 PR PREVENTIVE VISIT,EST,18-39: ICD-10-PCS | Mod: 25,S$GLB,, | Performed by: FAMILY MEDICINE

## 2021-03-16 PROCEDURE — 99999 PR PBB SHADOW E&M-EST. PATIENT-LVL III: CPT | Mod: PBBFAC,,, | Performed by: FAMILY MEDICINE

## 2021-03-16 PROCEDURE — 99395 PREV VISIT EST AGE 18-39: CPT | Mod: 25,S$GLB,, | Performed by: FAMILY MEDICINE

## 2021-03-16 PROCEDURE — 90471 TDAP VACCINE GREATER THAN OR EQUAL TO 7YO IM: ICD-10-PCS | Mod: S$GLB,,, | Performed by: FAMILY MEDICINE

## 2021-03-16 PROCEDURE — 1126F PR PAIN SEVERITY QUANTIFIED, NO PAIN PRESENT: ICD-10-PCS | Mod: S$GLB,,, | Performed by: FAMILY MEDICINE

## 2022-05-09 ENCOUNTER — OFFICE VISIT (OUTPATIENT)
Dept: PRIMARY CARE CLINIC | Facility: CLINIC | Age: 27
End: 2022-05-09
Payer: COMMERCIAL

## 2022-05-09 VITALS
RESPIRATION RATE: 18 BRPM | HEART RATE: 78 BPM | HEIGHT: 70 IN | DIASTOLIC BLOOD PRESSURE: 72 MMHG | OXYGEN SATURATION: 99 % | BODY MASS INDEX: 23.84 KG/M2 | WEIGHT: 166.56 LBS | SYSTOLIC BLOOD PRESSURE: 118 MMHG

## 2022-05-09 DIAGNOSIS — Z11.59 NEED FOR HEPATITIS C SCREENING TEST: ICD-10-CM

## 2022-05-09 DIAGNOSIS — Z13.6 ENCOUNTER FOR SCREENING FOR CARDIOVASCULAR DISORDERS: ICD-10-CM

## 2022-05-09 DIAGNOSIS — Z11.4 SCREENING FOR HIV (HUMAN IMMUNODEFICIENCY VIRUS): ICD-10-CM

## 2022-05-09 DIAGNOSIS — Z00.00 ANNUAL PHYSICAL EXAM: Primary | ICD-10-CM

## 2022-05-09 PROBLEM — F90.2 ATTENTION DEFICIT HYPERACTIVITY DISORDER, COMBINED TYPE: Status: RESOLVED | Noted: 2017-09-13 | Resolved: 2022-05-09

## 2022-05-09 PROCEDURE — 1160F RVW MEDS BY RX/DR IN RCRD: CPT | Mod: CPTII,S$GLB,, | Performed by: FAMILY MEDICINE

## 2022-05-09 PROCEDURE — 3008F BODY MASS INDEX DOCD: CPT | Mod: CPTII,S$GLB,, | Performed by: FAMILY MEDICINE

## 2022-05-09 PROCEDURE — 3074F SYST BP LT 130 MM HG: CPT | Mod: CPTII,S$GLB,, | Performed by: FAMILY MEDICINE

## 2022-05-09 PROCEDURE — 3074F PR MOST RECENT SYSTOLIC BLOOD PRESSURE < 130 MM HG: ICD-10-PCS | Mod: CPTII,S$GLB,, | Performed by: FAMILY MEDICINE

## 2022-05-09 PROCEDURE — 1159F PR MEDICATION LIST DOCUMENTED IN MEDICAL RECORD: ICD-10-PCS | Mod: CPTII,S$GLB,, | Performed by: FAMILY MEDICINE

## 2022-05-09 PROCEDURE — 3078F PR MOST RECENT DIASTOLIC BLOOD PRESSURE < 80 MM HG: ICD-10-PCS | Mod: CPTII,S$GLB,, | Performed by: FAMILY MEDICINE

## 2022-05-09 PROCEDURE — 99999 PR PBB SHADOW E&M-EST. PATIENT-LVL III: CPT | Mod: PBBFAC,,, | Performed by: FAMILY MEDICINE

## 2022-05-09 PROCEDURE — 99395 PR PREVENTIVE VISIT,EST,18-39: ICD-10-PCS | Mod: S$GLB,,, | Performed by: FAMILY MEDICINE

## 2022-05-09 PROCEDURE — 99395 PREV VISIT EST AGE 18-39: CPT | Mod: S$GLB,,, | Performed by: FAMILY MEDICINE

## 2022-05-09 PROCEDURE — 1160F PR REVIEW ALL MEDS BY PRESCRIBER/CLIN PHARMACIST DOCUMENTED: ICD-10-PCS | Mod: CPTII,S$GLB,, | Performed by: FAMILY MEDICINE

## 2022-05-09 PROCEDURE — 3078F DIAST BP <80 MM HG: CPT | Mod: CPTII,S$GLB,, | Performed by: FAMILY MEDICINE

## 2022-05-09 PROCEDURE — 3008F PR BODY MASS INDEX (BMI) DOCUMENTED: ICD-10-PCS | Mod: CPTII,S$GLB,, | Performed by: FAMILY MEDICINE

## 2022-05-09 PROCEDURE — 99999 PR PBB SHADOW E&M-EST. PATIENT-LVL III: ICD-10-PCS | Mod: PBBFAC,,, | Performed by: FAMILY MEDICINE

## 2022-05-09 PROCEDURE — 1159F MED LIST DOCD IN RCRD: CPT | Mod: CPTII,S$GLB,, | Performed by: FAMILY MEDICINE

## 2022-05-09 NOTE — PROGRESS NOTES
"Subjective:       Patient ID: Anil Velasquez is a 26 y.o. male.    Chief Complaint: Annual Exam (fasting)    Here for annual physical. No recent illness or injury. No particular concerns.    Review of Systems   Constitutional: Negative for chills, fatigue and fever.   HENT: Negative for congestion and trouble swallowing.    Eyes: Negative for visual disturbance.   Respiratory: Negative for cough and shortness of breath.    Cardiovascular: Negative for chest pain.   Gastrointestinal: Negative for abdominal pain, blood in stool, nausea and vomiting.   Endocrine: Negative for polyphagia and polyuria.   Genitourinary: Negative for difficulty urinating.   Musculoskeletal: Negative for arthralgias.   Skin: Negative for rash and wound.   Allergic/Immunologic: Negative for immunocompromised state.   Neurological: Negative for dizziness.   Hematological: Does not bruise/bleed easily.   Psychiatric/Behavioral: Negative for agitation, confusion and sleep disturbance.       Objective:      Vitals:    05/09/22 0901   BP: 118/72   BP Location: Right arm   Patient Position: Sitting   BP Method: Medium (Manual)   Pulse: 78   Resp: 18   SpO2: 99%   Weight: 75.5 kg (166 lb 8.9 oz)   Height: 5' 10" (1.778 m)     Physical Exam  Vitals and nursing note reviewed.   Constitutional:       Appearance: He is well-developed.   HENT:      Head: Normocephalic and atraumatic.   Eyes:      Pupils: Pupils are equal, round, and reactive to light.   Neck:      Vascular: No carotid bruit or JVD.   Cardiovascular:      Rate and Rhythm: Normal rate and regular rhythm.      Pulses:           Radial pulses are 2+ on the right side and 2+ on the left side.      Heart sounds: Normal heart sounds.   Pulmonary:      Effort: Pulmonary effort is normal.      Breath sounds: Normal breath sounds.   Abdominal:      General: Bowel sounds are normal.      Palpations: Abdomen is soft.      Tenderness: There is no abdominal tenderness.   Musculoskeletal:      Cervical " back: Neck supple.   Skin:     General: Skin is warm and dry.   Neurological:      Mental Status: He is alert and oriented to person, place, and time.   Psychiatric:         Behavior: Behavior normal.         No results found for: WBC, HGB, HCT, PLT, CHOL, TRIG, HDL, LDLDIRECT, ALT, AST, NA, K, CL, CREATININE, BUN, CO2, TSH, PSA, INR, GLUF, HGBA1C, MICROALBUR   Assessment:       1. Annual physical exam    2. Encounter for screening for cardiovascular disorders    3. Screening for HIV (human immunodeficiency virus)    4. Need for hepatitis C screening test        Plan:       Annual physical exam  -     CBC Auto Differential; Future; Expected date: 05/09/2022  -     Comprehensive Metabolic Panel; Future; Expected date: 05/09/2022  -     Lipid Panel; Future; Expected date: 05/09/2022  -     TSH; Future; Expected date: 05/09/2022  -     Hepatitis C Antibody; Future; Expected date: 05/09/2022  -     HIV 1/2 Ag/Ab (4th Gen); Future; Expected date: 05/09/2022    Encounter for screening for cardiovascular disorders  -     CBC Auto Differential; Future; Expected date: 05/09/2022  -     Comprehensive Metabolic Panel; Future; Expected date: 05/09/2022  -     Lipid Panel; Future; Expected date: 05/09/2022  -     TSH; Future; Expected date: 05/09/2022    Screening for HIV (human immunodeficiency virus)  -     HIV 1/2 Ag/Ab (4th Gen); Future; Expected date: 05/09/2022    Need for hepatitis C screening test  -     Hepatitis C Antibody; Future; Expected date: 05/09/2022

## 2023-09-18 ENCOUNTER — PATIENT MESSAGE (OUTPATIENT)
Dept: PRIMARY CARE CLINIC | Facility: CLINIC | Age: 28
End: 2023-09-18
Payer: COMMERCIAL

## 2023-10-18 ENCOUNTER — PATIENT MESSAGE (OUTPATIENT)
Dept: CARDIOLOGY | Facility: CLINIC | Age: 28
End: 2023-10-18
Payer: COMMERCIAL

## 2024-03-11 ENCOUNTER — PATIENT OUTREACH (OUTPATIENT)
Dept: ADMINISTRATIVE | Facility: HOSPITAL | Age: 29
End: 2024-03-11
Payer: COMMERCIAL

## 2024-03-11 ENCOUNTER — PATIENT MESSAGE (OUTPATIENT)
Dept: ADMINISTRATIVE | Facility: HOSPITAL | Age: 29
End: 2024-03-11
Payer: COMMERCIAL

## 2024-03-11 NOTE — PROGRESS NOTES
Health Maintenance Due   Topic Date Due    Influenza Vaccine (1) Never done    COVID-19 Vaccine (3 - 2023-24 season) 2023     Population Health Chart Review & Patient Outreach Details        Further Action Needed If Patient Returns Outreach:       [facility] panel list reviewed. Patient due for annual wellness visit with PCP. Patient last seen 2022. Portal message sent in regards to scheduling      Updates Requested / Reviewed:                 [x]  Care Everywhere               []                [x]  External Sources ([facility], [facility], [facility], etc.)                          [x] [facility]   [x] [facility]   [] Other:               [x]  Care Team Updated              []  Removed  or Duplicate Orders              [x]  Immunization Reconciliation Completed / Queried                          [x] [place]   [] [place]   [] [place]   [] [place]        Health Maintenance Topics Addressed and Outreach Outcomes / Actions Taken:                Breast Cancer Screening []   Mammogram Order Placed    []   Mammogram Screening Scheduled    []   External Records Requested & Care Team Updated if Applicable    []   External Records Uploaded & Care Team Updated if Applicable    []   Pt Declined Scheduling Mammogram    []   Pt Will Schedule with External Provider / Order Routed & Care Team Updated if Applicable                  Cervical Cancer Screening []   Pap Smear Scheduled in Primary Care or OBGYN    []   External Records Requested & Care Team Updated if Applicable       []   External Records Uploaded, Care Team Updated, & History Updated if Applicable    []   Patient Declined Scheduling Pap Smear    []   Patient Will Schedule with External Provider & Care Team Updated if Applicable                      Colorectal Cancer Screening []   Colonoscopy Case Request / Referral / Home Test Order Placed    []   External Records Requested & Care Team Updated if Applicable    []   External Records Uploaded, Care Team Updated,  & History Updated if Applicable    []   Patient Declined Completing Colon Cancer Screening    []   Patient Will Schedule with External Provider & Care Team Updated if Applicable    []   Fit Kit Mailed (add the SmartPhrase under additional notes)    []   Reminded Patient to Complete Home Test                     Diabetic Eye Exam []   Eye Exam Screening Order Placed    []   Eye Camera Scheduled or Optometry/Ophthalmology Referral Placed    []   External Records Requested & Care Team Updated if Applicable    []   External Records Uploaded, Care Team Updated, & History Updated if Applicable    []   Patient Declined Scheduling Eye Exam    []   Patient Will Schedule with External Provider & Care Team Updated if Applicable                  Blood Pressure Control []   Primary Care Follow Up Visit Scheduled     []   Remote Blood Pressure Reading Captured    []   Patient Declined Remote Reading or Scheduling Appt - Escalated to PCP    []   Patient Will Call Back or Send Portal Message with Reading                     HbA1c & Other Labs []   Overdue Lab(s) Ordered    []   Overdue Lab(s) Scheduled    []   External Records Uploaded & Care Team Updated if Applicable    []   Primary Care Follow Up Visit Scheduled     []   Reminded Patient to Complete A1c Home Test    []   Patient Declined Scheduling Labs or Will Call Back to Schedule    []   Patient Will Schedule with External Provider / Order Routed, & Care Team Updated if Applicable               Primary Care Appointment []   Primary Care Appt Scheduled    []   Patient Declined Scheduling or Will Call Back to Schedule    []   Pt Established with External Provider, Updated Care Team, & Informed Pt to Notify Payor if Applicable               Medication Adherence /    Statin Use []   Primary Care Appointment Scheduled    []   Patient Reminded to  Prescription    []   Patient Declined, Provider Notified if Needed    []   Sent Provider Message to Review to Evaluate Pt for  Statin, Add Exclusion Dx Codes, Document   Exclusion in Problem List, Change Statin Intensity Level to Moderate or High Intensity if Applicable                     Osteoporosis Screening []   Dexa Order Placed    []   Dexa Appointment Scheduled    []   External Records Requested & Care Team Updated    []   External Records Uploaded, Care Team Updated, & History Updated if Applicable    []   Patient Declined Scheduling Dexa or Will Call Back to Schedule    []   Patient Will Schedule with External Provider / Order Routed & Care Team Updated if Applicable         Additional Notes:

## 2024-09-19 ENCOUNTER — PATIENT MESSAGE (OUTPATIENT)
Dept: PRIMARY CARE CLINIC | Facility: CLINIC | Age: 29
End: 2024-09-19
Payer: COMMERCIAL

## 2024-11-25 ENCOUNTER — OFFICE VISIT (OUTPATIENT)
Dept: PRIMARY CARE CLINIC | Facility: CLINIC | Age: 29
End: 2024-11-25
Payer: COMMERCIAL

## 2024-11-25 VITALS
TEMPERATURE: 98 F | HEIGHT: 70 IN | HEART RATE: 91 BPM | BODY MASS INDEX: 26.29 KG/M2 | WEIGHT: 183.63 LBS | RESPIRATION RATE: 18 BRPM | OXYGEN SATURATION: 99 % | DIASTOLIC BLOOD PRESSURE: 98 MMHG | SYSTOLIC BLOOD PRESSURE: 126 MMHG

## 2024-11-25 DIAGNOSIS — Z00.00 ANNUAL PHYSICAL EXAM: Primary | ICD-10-CM

## 2024-11-25 DIAGNOSIS — Z13.6 ENCOUNTER FOR SCREENING FOR CARDIOVASCULAR DISORDERS: ICD-10-CM

## 2024-11-25 DIAGNOSIS — R03.0 ELEVATED BP WITHOUT DIAGNOSIS OF HYPERTENSION: ICD-10-CM

## 2024-11-25 PROCEDURE — 3080F DIAST BP >= 90 MM HG: CPT | Mod: CPTII,S$GLB,, | Performed by: FAMILY MEDICINE

## 2024-11-25 PROCEDURE — 99395 PREV VISIT EST AGE 18-39: CPT | Mod: S$GLB,,, | Performed by: FAMILY MEDICINE

## 2024-11-25 PROCEDURE — 99999 PR PBB SHADOW E&M-EST. PATIENT-LVL III: CPT | Mod: PBBFAC,,, | Performed by: FAMILY MEDICINE

## 2024-11-25 PROCEDURE — 3008F BODY MASS INDEX DOCD: CPT | Mod: CPTII,S$GLB,, | Performed by: FAMILY MEDICINE

## 2024-11-25 PROCEDURE — 3074F SYST BP LT 130 MM HG: CPT | Mod: CPTII,S$GLB,, | Performed by: FAMILY MEDICINE

## 2024-11-25 PROCEDURE — 1159F MED LIST DOCD IN RCRD: CPT | Mod: CPTII,S$GLB,, | Performed by: FAMILY MEDICINE

## 2024-11-25 PROCEDURE — 1160F RVW MEDS BY RX/DR IN RCRD: CPT | Mod: CPTII,S$GLB,, | Performed by: FAMILY MEDICINE

## 2024-11-25 NOTE — PROGRESS NOTES
Assessment:       1. Annual physical exam    2. Elevated BP without diagnosis of hypertension    3. Encounter for screening for cardiovascular disorders         Plan:       Annual physical exam  -     CBC Auto Differential; Future; Expected date: 11/25/2024  -     Comprehensive Metabolic Panel; Future; Expected date: 11/25/2024  -     Lipid Panel; Future; Expected date: 11/25/2024  -     TSH; Future; Expected date: 11/25/2024    Elevated BP without diagnosis of hypertension  -     TSH; Future; Expected date: 11/25/2024    Encounter for screening for cardiovascular disorders  -     CBC Auto Differential; Future; Expected date: 11/25/2024  -     Comprehensive Metabolic Panel; Future; Expected date: 11/25/2024  -     Lipid Panel; Future; Expected date: 11/25/2024      Assessment & Plan    - Noted elevated blood pressure during visit, requiring recheck  - Considered family history of diabetes as potential risk factor  - Determined need for updated labs due to 2-2.5 year lapse since last labs  - Assessed patient's overall health status through targeted questions about recent illnesses, injuries, and specific symptoms    DIABETES MELLITUS (FAMILY HISTORY):   Ordered labs (fasting).   Return to complete ordered labs at patient's convenience.    GENERAL ADULT MEDICAL EXAMINATION:   Follow up for annual check-up.   Provided work note for patient to return to work tomorrow.             Subjective:    Patient ID: Anil Velasquez is a 29 y.o. male.  Chief Complaint: Annual Exam    HPI  History of Present Illness    CHIEF COMPLAINT:  Patient presents today for a routine checkup.    FAMILY HISTORY:  He reports diabetes runs in his family. He denies any family history of hypertension.    MEDICAL HISTORY:  He reports his last labs was performed approximately 2 to 2.5 years ago. He denies any recent labs.    MEDICATIONS:  He denies taking any current medications.    IMMUNIZATIONS:  He declines flu and COVID vaccinations when  "offered.    SOCIAL HISTORY:  He reports current employment.       Review of Systems   Constitutional:  Negative for activity change and unexpected weight change.   HENT:  Negative for hearing loss, rhinorrhea and trouble swallowing.    Eyes:  Negative for discharge and visual disturbance.   Respiratory:  Negative for chest tightness and wheezing.    Cardiovascular:  Negative for chest pain and palpitations.   Gastrointestinal:  Negative for blood in stool, constipation, diarrhea and vomiting.   Endocrine: Negative for polydipsia and polyuria.   Genitourinary:  Negative for difficulty urinating, hematuria and urgency.   Musculoskeletal:  Negative for arthralgias, joint swelling and neck pain.   Neurological:  Positive for weakness. Negative for headaches.   Psychiatric/Behavioral:  Negative for confusion and dysphoric mood.        Objective:      Vitals:    11/25/24 0919 11/25/24 1005   BP: (!) 138/100 (!) 126/98   BP Location: Right arm Right arm   Patient Position: Sitting Sitting   Pulse: 91    Resp: 18    Temp: 98 °F (36.7 °C)    TempSrc: Temporal    SpO2: 99%    Weight: 83.3 kg (183 lb 10.3 oz)    Height: 5' 10" (1.778 m)      BP Readings from Last 5 Encounters:   11/25/24 (!) 126/98   05/09/22 118/72   04/09/22 123/81   03/16/21 114/72   06/27/18 110/69     Wt Readings from Last 5 Encounters:   11/25/24 83.3 kg (183 lb 10.3 oz)   05/09/22 75.5 kg (166 lb 8.9 oz)   04/09/22 77.1 kg (170 lb)   03/16/21 75.6 kg (166 lb 10.7 oz)   06/27/18 73 kg (161 lb)     Physical Exam  Physical Exam    Vitals: Elevated blood pressure.  General: Well-developed. Well-nourished. No acute distress.  Eyes: EOMI. Sclerae anicteric.  HENT: Normocephalic. Atraumatic. Nares patent. Moist oral mucosa.  Cardiovascular: Regular rate. Regular rhythm. No murmurs. No rubs. No gallops. Normal S1, S2.  Respiratory: Normal respiratory effort. Clear to auscultation bilaterally. No rales. No rhonchi. No wheezing.  Musculoskeletal: No  obvious " deformity.  Extremities: No lower extremity edema.  Neurological: Alert & oriented x3. No slurred speech. Normal gait.  Psychiatric: Normal mood. Normal affect. Good insight. Good judgment.  Skin: Warm. Dry. No rash.         Lab Results   Component Value Date    WBC 6.25 05/09/2022    HGB 14.9 05/09/2022    HCT 47.0 05/09/2022     05/09/2022    CHOL 226 (H) 05/09/2022    TRIG 87 05/09/2022    HDL 37 (L) 05/09/2022    ALT 14 05/09/2022    AST 15 05/09/2022     05/09/2022    K 4.1 05/09/2022     05/09/2022    CREATININE 1.0 05/09/2022    BUN 13 05/09/2022    CO2 24 05/09/2022    TSH 0.976 05/09/2022      This note was generated with the assistance of ambient listening technology. Verbal consent was obtained by the patient and accompanying visitor(s) for the recording of patient appointment to facilitate this note. I attest to having reviewed and edited the generated note for accuracy, though some syntax or spelling errors may persist. Please contact the author of this note for any clarification.

## 2024-11-25 NOTE — LETTER
November 25, 2024      [facility] [address]  [place] [address]  Phone: [phone]  Fax: [phone]       Patient: Anil Velasquez   YOB: 1995  Date of Visit: 11/25/2024    To Whom It May Concern:    Kristin Velasquez  was at [facility] on 11/25/2024. The patient may return to work/school on 11/26/24 . If you have any questions or concerns, or if I can be of further assistance, please do not hesitate to contact me.    Sincerely,    Shannon Junior MA

## 2025-03-17 ENCOUNTER — OFFICE VISIT (OUTPATIENT)
Dept: PRIMARY CARE CLINIC | Facility: CLINIC | Age: 30
End: 2025-03-17
Payer: COMMERCIAL

## 2025-03-17 VITALS
WEIGHT: 184.63 LBS | HEIGHT: 70 IN | OXYGEN SATURATION: 100 % | SYSTOLIC BLOOD PRESSURE: 130 MMHG | RESPIRATION RATE: 18 BRPM | BODY MASS INDEX: 26.43 KG/M2 | DIASTOLIC BLOOD PRESSURE: 92 MMHG | TEMPERATURE: 99 F | HEART RATE: 80 BPM

## 2025-03-17 DIAGNOSIS — I10 ESSENTIAL HYPERTENSION, BENIGN: Primary | ICD-10-CM

## 2025-03-17 DIAGNOSIS — Z13.6 ENCOUNTER FOR SCREENING FOR CARDIOVASCULAR DISORDERS: ICD-10-CM

## 2025-03-17 DIAGNOSIS — Z13.1 SCREENING FOR DIABETES MELLITUS: ICD-10-CM

## 2025-03-17 LAB
OHS QRS DURATION: 88 MS
OHS QTC CALCULATION: 420 MS

## 2025-03-17 PROCEDURE — 3080F DIAST BP >= 90 MM HG: CPT | Mod: CPTII,S$GLB,, | Performed by: FAMILY MEDICINE

## 2025-03-17 PROCEDURE — 3075F SYST BP GE 130 - 139MM HG: CPT | Mod: CPTII,S$GLB,, | Performed by: FAMILY MEDICINE

## 2025-03-17 PROCEDURE — 3008F BODY MASS INDEX DOCD: CPT | Mod: CPTII,S$GLB,, | Performed by: FAMILY MEDICINE

## 2025-03-17 PROCEDURE — 93005 ELECTROCARDIOGRAM TRACING: CPT | Mod: S$GLB,,, | Performed by: FAMILY MEDICINE

## 2025-03-17 PROCEDURE — 1159F MED LIST DOCD IN RCRD: CPT | Mod: CPTII,S$GLB,, | Performed by: FAMILY MEDICINE

## 2025-03-17 PROCEDURE — 99214 OFFICE O/P EST MOD 30 MIN: CPT | Mod: S$GLB,,, | Performed by: FAMILY MEDICINE

## 2025-03-17 PROCEDURE — 93010 ELECTROCARDIOGRAM REPORT: CPT | Mod: S$GLB,,, | Performed by: INTERNAL MEDICINE

## 2025-03-17 PROCEDURE — 1160F RVW MEDS BY RX/DR IN RCRD: CPT | Mod: CPTII,S$GLB,, | Performed by: FAMILY MEDICINE

## 2025-03-17 PROCEDURE — 99999 PR PBB SHADOW E&M-EST. PATIENT-LVL IV: CPT | Mod: PBBFAC,,, | Performed by: FAMILY MEDICINE

## 2025-03-17 RX ORDER — AMLODIPINE BESYLATE 2.5 MG/1
2.5 TABLET ORAL DAILY
Qty: 90 TABLET | Refills: 0 | Status: SHIPPED | OUTPATIENT
Start: 2025-03-17

## 2025-03-17 NOTE — LETTER
March 17, 2025      [facility] [address]  [place] [address]  Phone: [phone]  Fax: [phone]       Patient: Anil Velasquez   YOB: 1995  Date of Visit: 03/17/2025    To Whom It May Concern:    Kristin Velasquez  was at [facility] on 03/17/2025. The patient may return to work/school on 3/18/2025 with no restrictions. If you have any questions or concerns, or if I can be of further assistance, please do not hesitate to contact me.    Sincerely,        Lauren Reyes, LPN

## 2025-03-17 NOTE — PROGRESS NOTES
Assessment:       1. Essential hypertension, benign    2. Encounter for screening for cardiovascular disorders    3. Screening for diabetes mellitus         Plan:       Essential hypertension, benign  -     EKG 12-lead  -     CBC Auto Differential; Future; Expected date: 03/17/2025  -     Comprehensive Metabolic Panel; Future; Expected date: 03/17/2025  -     TSH; Future; Expected date: 03/17/2025  -     URINALYSIS; Future; Expected date: 03/17/2025  -     amLODIPine (NORVASC) 2.5 MG tablet; Take 1 tablet (2.5 mg total) by mouth once daily.  Dispense: 90 tablet; Refill: 0    Encounter for screening for cardiovascular disorders  -     Lipid Panel; Future; Expected date: 03/17/2025    Screening for diabetes mellitus  -     Hemoglobin A1C; Future; Expected date: 03/17/2025      Assessment & Plan    - Assessed reported symptoms of elevated blood pressure, fatigue, headaches, and dizziness.  - Considered family history of HTN and diabetes.  - Initiated low-dose antihypertensive therapy to gradually lower blood pressure, considering outdoor work and potential for sweating-induced BP fluctuations.  - Explained that symptoms like headaches should improve as blood pressure is controlled.    ESSENTIAL HYPERTENSION:   Prescribed Amlodipine 2.5 mg daily for blood pressure control.   Scheduled a follow-up nurse visit in 2 weeks to recheck blood pressure.   Ordered an EKG to establish baseline cardiac function, to be performed in office.   Patient reports experiencing high blood pressure, feeling hot and flushed, headaches, and dizziness.   Noted family history of hypertension.   Assessed the need for blood pressure medication.   Inquired about family history of heart problems, strokes, or heart attacks.    FATIGUE:   Patient reports feeling fatigued and tired.   Advised that the prescribed blood pressure medication may help alleviate fatigue symptoms.    FAMILY HISTORY OF DIABETES:   Noted family history of diabetes.   Patient  "to be cautious with Dr. Pepper consumption due to high sugar content.    ADDITIONAL NOTES:   Ordered labs and urinalysis to be completed at hospital lab.   Provided work excuse note for today's visit.       Medication List with Changes/Refills   New Medications    AMLODIPINE (NORVASC) 2.5 MG TABLET    Take 1 tablet (2.5 mg total) by mouth once daily.         Subjective:    Patient ID: Anil Velasquez is a 29 y.o. male.  Chief Complaint: Fatigue    Hypertension  This is a recurrent problem. The current episode started more than 1 month ago. The problem is unchanged. The problem is controlled.     History of Present Illness    CHIEF COMPLAINT:  Patient presents today for follow up of high blood pressure and fatigue.    CURRENT SYMPTOMS:  He reports fatigue, tiredness, occasional headaches, feeling hot and flushed, and episodes of dizziness. He has not eaten anything this morning.    SOCIAL HISTORY:  He regularly consumes caffeinated beverages including Coke and Dr. Pepper.    FAMILY HISTORY:  He reports family history of hypertension in grandmother, grandfather, and brother, as well as diabetes.      ROS:  Constitutional: +fatigue, +dizziness  Head: +headaches  Respiratory: -difficulty breathing  Cardiovascular: -chest pain, -lower extremity edema  Gastrointestinal: -nausea, -vomiting  Endocrine: +heat intolerance       Review of Systems   Constitutional:  Positive for fatigue.       Objective:      Vitals:    03/17/25 0838   BP: (!) 130/92   BP Location: Left arm   Patient Position: Sitting   Pulse: 80   Resp: 18   Temp: 98.5 °F (36.9 °C)   TempSrc: Temporal   SpO2: 100%   Weight: 83.7 kg (184 lb 10.2 oz)   Height: 5' 10" (1.778 m)     BP Readings from Last 5 Encounters:   03/17/25 (!) 130/92   11/25/24 (!) 126/98   05/09/22 118/72   04/09/22 123/81   03/16/21 114/72     Wt Readings from Last 5 Encounters:   03/17/25 83.7 kg (184 lb 10.2 oz)   11/25/24 83.3 kg (183 lb 10.3 oz)   05/09/22 75.5 kg (166 lb 8.9 oz) "   04/09/22 77.1 kg (170 lb)   03/16/21 75.6 kg (166 lb 10.7 oz)     Physical Exam  Physical Exam    General: Well-developed. Well-nourished. No acute distress.  Eyes: EOMI. Sclerae anicteric.  HENT: Normocephalic. Atraumatic. Nares patent. Moist oral mucosa.  Cardiovascular: Regular rate. Regular rhythm. No murmurs. No rubs. No gallops. Normal S1, S2.  Respiratory: Normal respiratory effort. Clear to auscultation bilaterally. No rales. No rhonchi. No wheezing.  Musculoskeletal: No  obvious deformity.  Extremities: No lower extremity edema.  Neurological: Alert & oriented x3. No slurred speech. Normal gait.  Psychiatric: Normal mood. Normal affect. Good insight. Good judgment.  Skin: Warm. Dry. No rash.         Lab Results   Component Value Date    WBC 6.16 03/17/2025    HGB 14.8 03/17/2025    HCT 44.5 03/17/2025     03/17/2025    CHOL 240 (H) 03/17/2025    TRIG 93 03/17/2025    HDL 36 (L) 03/17/2025    ALT 25 03/17/2025    AST 19 03/17/2025     03/17/2025    K 4.0 03/17/2025     03/17/2025    CREATININE 0.8 03/17/2025    BUN 10 03/17/2025    CO2 23 03/17/2025    TSH 0.976 05/09/2022      This note was generated with the assistance of ambient listening technology. Verbal consent was obtained by the patient and accompanying visitor(s) for the recording of patient appointment to facilitate this note. I attest to having reviewed and edited the generated note for accuracy, though some syntax or spelling errors may persist. Please contact the author of this note for any clarification.

## 2025-03-18 ENCOUNTER — RESULTS FOLLOW-UP (OUTPATIENT)
Dept: PRIMARY CARE CLINIC | Facility: CLINIC | Age: 30
End: 2025-03-18

## 2025-03-31 ENCOUNTER — OFFICE VISIT (OUTPATIENT)
Dept: PRIMARY CARE CLINIC | Facility: CLINIC | Age: 30
End: 2025-03-31
Payer: COMMERCIAL

## 2025-03-31 VITALS
SYSTOLIC BLOOD PRESSURE: 124 MMHG | HEART RATE: 83 BPM | TEMPERATURE: 97 F | RESPIRATION RATE: 17 BRPM | HEIGHT: 70 IN | BODY MASS INDEX: 26.67 KG/M2 | WEIGHT: 186.31 LBS | DIASTOLIC BLOOD PRESSURE: 74 MMHG | OXYGEN SATURATION: 94 %

## 2025-03-31 DIAGNOSIS — I10 ESSENTIAL HYPERTENSION, BENIGN: Primary | ICD-10-CM

## 2025-03-31 PROCEDURE — 3044F HG A1C LEVEL LT 7.0%: CPT | Mod: CPTII,S$GLB,, | Performed by: NURSE PRACTITIONER

## 2025-03-31 PROCEDURE — 99999 PR PBB SHADOW E&M-EST. PATIENT-LVL III: CPT | Mod: PBBFAC,,, | Performed by: NURSE PRACTITIONER

## 2025-03-31 PROCEDURE — 3074F SYST BP LT 130 MM HG: CPT | Mod: CPTII,S$GLB,, | Performed by: NURSE PRACTITIONER

## 2025-03-31 PROCEDURE — 1159F MED LIST DOCD IN RCRD: CPT | Mod: CPTII,S$GLB,, | Performed by: NURSE PRACTITIONER

## 2025-03-31 PROCEDURE — 99213 OFFICE O/P EST LOW 20 MIN: CPT | Mod: S$GLB,,, | Performed by: NURSE PRACTITIONER

## 2025-03-31 PROCEDURE — 3078F DIAST BP <80 MM HG: CPT | Mod: CPTII,S$GLB,, | Performed by: NURSE PRACTITIONER

## 2025-03-31 PROCEDURE — 3008F BODY MASS INDEX DOCD: CPT | Mod: CPTII,S$GLB,, | Performed by: NURSE PRACTITIONER

## 2025-04-03 NOTE — PROGRESS NOTES
Assessment:       1. Essential hypertension, benign         Plan:       Essential hypertension, benign    Well controlled on low dose amlodipine. Continue current regimen.     Assessment & Plan    IMPRESSION:  - BP slightly elevated at 130/92 at previous visit. Started on amlodipine, normal today.  - Total cholesterol elevated with low HDL.  - Active treatment for cholesterol not necessary at this time.  - Slightly elevated bilirubin, not concerning given patient's history.    ESSENTIAL HYPERTENSION:   Assessed the patient's response to antihypertensive medication initiated 2 weeks ago, noting slight edema as the only side effect.   Confirmed absence of dizziness or chest pain.   Reviewed the patient's chart and family history of hypertension.   Performed physical exam, including cardiac and pulmonary auscultation.   Continued low-dose antihypertensive medication.    HYPERLIPIDEMIA:   Monitored patient's cholesterol levels, noting elevated total cholesterol and LDL with low HDL.   Noted patient's HDL level at 36, below the target range of 45-50.   Reviewed labs results with the patient.   Discussed potential for active treatment when patient reaches age 41.n increase exercise to help HDL.     OVERWEIGHT:   Calculated patient's BMI as 26, indicating slight overweight status.   Recommend weight loss and regular exercise, specifically 30 minutes of walking 5 days a week.   Advised patient to lose approximately 5 lbs.    TOBACCO USE PREVENTION:   Counseled patient on importance of avoiding smoking initiation.   Confirmed patient's non-smoking status.   Reinforced importance of maintaining tobacco-free lifestyle due to cholesterol risk.    OTHER FINDINGS:   Noted slightly elevated bilirubin levels, deemed non-concerning.    FOLLOW-UP:   Scheduled follow-up visit in 1 year.       Medication List with Changes/Refills   Current Medications    AMLODIPINE (NORVASC) 2.5 MG TABLET    Take 1 tablet (2.5 mg total) by mouth once  "daily.         Subjective:    Patient ID: Anil Velasquez is a 29 y.o. male.  Chief Complaint: 2 week bp check    History of Present Illness    CHIEF COMPLAINT:  Anil presents today for blood pressure follow up.    HYPERTENSION:  He started blood pressure medication 2 weeks ago and denies any side effects.    FAMILY HISTORY:  He has family history of hypertension in grandparents.    LABS:  Cholesterol panel shows elevated LDL with low HDL levels. Bilirubin was slightly elevated but lower than previous values and not of clinical concern. Blood count was normal.    SOCIAL HISTORY:  He is a non-smoker.       Review of Systems   Constitutional:  Negative for chills and fever.   HENT:  Negative for ear pain, nosebleeds, sinus pressure and sore throat.    Respiratory:  Negative for cough and shortness of breath.    Cardiovascular:  Negative for chest pain and palpitations.   Gastrointestinal:  Negative for diarrhea, nausea and vomiting.   Genitourinary: Negative.    Psychiatric/Behavioral:  Negative for dysphoric mood and sleep disturbance. The patient is not nervous/anxious.        Objective:      Vitals:    03/31/25 1124   BP: 124/74   BP Location: Left arm   Patient Position: Sitting   Pulse: 83   Resp: 17   Temp: 97.4 °F (36.3 °C)   TempSrc: Temporal   SpO2: (!) 94%   Weight: 84.5 kg (186 lb 4.6 oz)   Height: 5' 10" (1.778 m)     BP Readings from Last 5 Encounters:   03/31/25 124/74   03/17/25 (!) 130/92   11/25/24 (!) 126/98   05/09/22 118/72   04/09/22 123/81     Wt Readings from Last 5 Encounters:   03/31/25 84.5 kg (186 lb 4.6 oz)   03/17/25 83.7 kg (184 lb 10.2 oz)   11/25/24 83.3 kg (183 lb 10.3 oz)   05/09/22 75.5 kg (166 lb 8.9 oz)   04/09/22 77.1 kg (170 lb)     Physical Exam  Constitutional:       General: He is not in acute distress.     Appearance: Normal appearance. He is not ill-appearing.   HENT:      Head: Normocephalic.      Mouth/Throat:      Mouth: Mucous membranes are moist.      Pharynx: No " pharyngeal swelling or oropharyngeal exudate.      Tonsils: No tonsillar exudate.   Eyes:      Conjunctiva/sclera:      Right eye: Right conjunctiva is not injected.      Left eye: Left conjunctiva is not injected.   Cardiovascular:      Rate and Rhythm: Normal rate and regular rhythm.      Pulses:           Radial pulses are 1+ on the right side and 1+ on the left side.      Heart sounds: No murmur heard.     No systolic murmur is present.      No diastolic murmur is present.   Pulmonary:      Effort: No tachypnea or bradypnea.      Breath sounds: Normal breath sounds. No decreased breath sounds, wheezing, rhonchi or rales.   Abdominal:      General: There is no distension.   Musculoskeletal:      Right lower leg: No edema.      Left lower leg: No edema.   Skin:     General: Skin is warm and dry.   Neurological:      Mental Status: He is alert.   Psychiatric:         Attention and Perception: Attention normal.         Speech: Speech normal.         Behavior: Behavior normal.           Lab Results   Component Value Date    WBC 6.16 03/17/2025    HGB 14.8 03/17/2025    HCT 44.5 03/17/2025     03/17/2025    CHOL 240 (H) 03/17/2025    TRIG 93 03/17/2025    HDL 36 (L) 03/17/2025    ALT 25 03/17/2025    AST 19 03/17/2025     03/17/2025    K 4.0 03/17/2025     03/17/2025    CREATININE 0.8 03/17/2025    BUN 10 03/17/2025    CO2 23 03/17/2025    TSH 0.979 03/17/2025    HGBA1C 4.9 03/17/2025      This note was generated with the assistance of ambient listening technology. Verbal consent was obtained by the patient and accompanying visitor(s) for the recording of patient appointment to facilitate this note. I attest to having reviewed and edited the generated note for accuracy, though some syntax or spelling errors may persist. Please contact the author of this note for any clarification.

## 2025-05-19 DIAGNOSIS — I10 ESSENTIAL HYPERTENSION, BENIGN: ICD-10-CM

## 2025-05-19 NOTE — TELEPHONE ENCOUNTER
No care due was identified.  [facility] Embedded Care Due Messages. Reference number: [id number].   5/19/2025 10:46:41 AM CDT

## 2025-05-20 RX ORDER — AMLODIPINE BESYLATE 2.5 MG/1
2.5 TABLET ORAL DAILY
Qty: 90 TABLET | Refills: 3 | Status: SHIPPED | OUTPATIENT
Start: 2025-05-20

## 2025-05-20 NOTE — TELEPHONE ENCOUNTER
Refill Routing Note   Medication(s) are not appropriate for processing by [facility] for the following reason(s):        New or recently adjusted medication    [facility] action(s):  Defer             Appointments  past 12m or future 3m with PCP    Date Provider   Last Visit   3/17/2025 Ovi Pablo MD   Next Visit   Visit date not found Ovi Pablo MD   ED visits in past 90 days: 0        Note composed:8:28 PM 05/19/2025